# Patient Record
Sex: MALE | Race: OTHER | HISPANIC OR LATINO | Employment: UNEMPLOYED | URBAN - METROPOLITAN AREA
[De-identification: names, ages, dates, MRNs, and addresses within clinical notes are randomized per-mention and may not be internally consistent; named-entity substitution may affect disease eponyms.]

---

## 2021-04-09 ENCOUNTER — APPOINTMENT (EMERGENCY)
Dept: RADIOLOGY | Facility: HOSPITAL | Age: 36
End: 2021-04-09

## 2021-04-09 ENCOUNTER — HOSPITAL ENCOUNTER (EMERGENCY)
Facility: HOSPITAL | Age: 36
Discharge: HOME | End: 2021-04-09
Attending: EMERGENCY MEDICINE | Admitting: EMERGENCY MEDICINE

## 2021-04-09 VITALS
SYSTOLIC BLOOD PRESSURE: 116 MMHG | DIASTOLIC BLOOD PRESSURE: 65 MMHG | RESPIRATION RATE: 18 BRPM | HEART RATE: 66 BPM | OXYGEN SATURATION: 100 % | WEIGHT: 171.96 LBS | TEMPERATURE: 97.5 F

## 2021-04-09 DIAGNOSIS — R07.9 CHEST PAIN: Primary | ICD-10-CM

## 2021-04-09 DIAGNOSIS — R00.2 PALPITATIONS: ICD-10-CM

## 2021-04-09 LAB
ALBUMIN SERPL BCP-MCNC: 4.3 G/DL (ref 3.5–5)
ALP SERPL-CCNC: 72 U/L (ref 46–116)
ALT SERPL W P-5'-P-CCNC: 21 U/L (ref 12–78)
ANION GAP SERPL CALCULATED.3IONS-SCNC: 9 MMOL/L (ref 4–13)
AST SERPL W P-5'-P-CCNC: 18 U/L (ref 5–45)
ATRIAL RATE: 60 BPM
BASOPHILS # BLD AUTO: 0.02 THOUSANDS/ΜL (ref 0–0.1)
BASOPHILS NFR BLD AUTO: 0 % (ref 0–1)
BILIRUB SERPL-MCNC: 0.73 MG/DL (ref 0.2–1)
BUN SERPL-MCNC: 15 MG/DL (ref 5–25)
CALCIUM SERPL-MCNC: 9.4 MG/DL (ref 8.3–10.1)
CHLORIDE SERPL-SCNC: 107 MMOL/L (ref 100–108)
CO2 SERPL-SCNC: 26 MMOL/L (ref 21–32)
CREAT SERPL-MCNC: 0.98 MG/DL (ref 0.6–1.3)
EOSINOPHIL # BLD AUTO: 0.02 THOUSAND/ΜL (ref 0–0.61)
EOSINOPHIL NFR BLD AUTO: 0 % (ref 0–6)
ERYTHROCYTE [DISTWIDTH] IN BLOOD BY AUTOMATED COUNT: 11.7 % (ref 11.6–15.1)
GFR SERPL CREATININE-BSD FRML MDRD: 99 ML/MIN/1.73SQ M
GLUCOSE SERPL-MCNC: 100 MG/DL (ref 65–140)
HCT VFR BLD AUTO: 42.8 % (ref 36.5–49.3)
HGB BLD-MCNC: 14.7 G/DL (ref 12–17)
IMM GRANULOCYTES # BLD AUTO: 0.01 THOUSAND/UL (ref 0–0.2)
IMM GRANULOCYTES NFR BLD AUTO: 0 % (ref 0–2)
LIPASE SERPL-CCNC: 113 U/L (ref 73–393)
LYMPHOCYTES # BLD AUTO: 1.31 THOUSANDS/ΜL (ref 0.6–4.47)
LYMPHOCYTES NFR BLD AUTO: 23 % (ref 14–44)
MCH RBC QN AUTO: 31.8 PG (ref 26.8–34.3)
MCHC RBC AUTO-ENTMCNC: 34.3 G/DL (ref 31.4–37.4)
MCV RBC AUTO: 93 FL (ref 82–98)
MONOCYTES # BLD AUTO: 0.35 THOUSAND/ΜL (ref 0.17–1.22)
MONOCYTES NFR BLD AUTO: 6 % (ref 4–12)
NEUTROPHILS # BLD AUTO: 4.11 THOUSANDS/ΜL (ref 1.85–7.62)
NEUTS SEG NFR BLD AUTO: 71 % (ref 43–75)
NRBC BLD AUTO-RTO: 0 /100 WBCS
P AXIS: 61 DEGREES
PLATELET # BLD AUTO: 204 THOUSANDS/UL (ref 149–390)
PMV BLD AUTO: 9.7 FL (ref 8.9–12.7)
POTASSIUM SERPL-SCNC: 3.8 MMOL/L (ref 3.5–5.3)
PR INTERVAL: 130 MS
PROT SERPL-MCNC: 7.6 G/DL (ref 6.4–8.2)
QRS AXIS: 77 DEGREES
QRSD INTERVAL: 96 MS
QT INTERVAL: 400 MS
QTC INTERVAL: 400 MS
RBC # BLD AUTO: 4.62 MILLION/UL (ref 3.88–5.62)
SODIUM SERPL-SCNC: 142 MMOL/L (ref 136–145)
T WAVE AXIS: 67 DEGREES
TROPONIN I SERPL-MCNC: <0.02 NG/ML
VENTRICULAR RATE: 60 BPM
WBC # BLD AUTO: 5.82 THOUSAND/UL (ref 4.31–10.16)

## 2021-04-09 PROCEDURE — 84484 ASSAY OF TROPONIN QUANT: CPT | Performed by: EMERGENCY MEDICINE

## 2021-04-09 PROCEDURE — 85025 COMPLETE CBC W/AUTO DIFF WBC: CPT | Performed by: EMERGENCY MEDICINE

## 2021-04-09 PROCEDURE — 80053 COMPREHEN METABOLIC PANEL: CPT | Performed by: EMERGENCY MEDICINE

## 2021-04-09 PROCEDURE — 36415 COLL VENOUS BLD VENIPUNCTURE: CPT | Performed by: EMERGENCY MEDICINE

## 2021-04-09 PROCEDURE — 93005 ELECTROCARDIOGRAM TRACING: CPT

## 2021-04-09 PROCEDURE — 93010 ELECTROCARDIOGRAM REPORT: CPT | Performed by: INTERNAL MEDICINE

## 2021-04-09 PROCEDURE — 99285 EMERGENCY DEPT VISIT HI MDM: CPT | Performed by: EMERGENCY MEDICINE

## 2021-04-09 PROCEDURE — 71046 X-RAY EXAM CHEST 2 VIEWS: CPT

## 2021-04-09 PROCEDURE — 83690 ASSAY OF LIPASE: CPT | Performed by: EMERGENCY MEDICINE

## 2021-04-09 PROCEDURE — 99285 EMERGENCY DEPT VISIT HI MDM: CPT

## 2021-04-09 NOTE — DISCHARGE INSTRUCTIONS
Regrese a la carmen de emergencias si el dolor empeora gravemente, aumenta la dificultad para respirar, [place], [place], vómitos, fiebre o cualquier otro síntoma nuevo o preocupante  Return to the emergency department for severe worsening of pain, increased difficulty breathing, lightheadedness, fainting, vomiting, fever, any other new or concerning symptoms

## 2021-04-09 NOTE — ED PROVIDER NOTES
History  Chief Complaint   Patient presents with    Palpitations     palpitations, chest pain and sob after taking caffeine pill with coffee     HPI  27 yo male with no significant known PMH presents to the ED via EMS with concern for chest discomfort radiating to left arm, palpitations, and shortness of breath starting approximately 1 hour prior to arrival while he was driving to work  Pt stopped his car and began walking on the side of the road but symptoms persisted so he decided to come to the ED  A neighbor who is a nurse gave him 3 aspirin tablets and called EMS for him  Sxs are still present on arrival to the ED but have improved somewhat  Pt cannot describe pain and cannot identify any aggravating or alleviating factors  Notes he did take caffeine pill this morning but he takes them often and has never had symptoms associated with them before  Pt denies nausea, vomiting, lightheadedness, dizziness, but he does report some generalized weakness and fatigue  He also reports that he had some left upper abdominal pain and diarrhea earlier this morning that has since improved  Notes that he had similar chest pain and palpitations 2 years ago when he lived in [facility] and it was associated with bright red blood in his urine  Pt denies any urinary symptoms currently  No fevers, chills, cough, congestion, leg pain/swelling, hx DVT/PE, recent surgery, recent travel  Pt does not take any daily medications  None       History reviewed  No pertinent past medical history  History reviewed  No pertinent surgical history  History reviewed  No pertinent family history  I have reviewed and agree with the history as documented      E-Cigarette/Vaping    E-Cigarette Use Never User      E-Cigarette/Vaping Substances    Nicotine No     THC No     CBD No     Flavoring No     Other No     Unknown No      Social History     Tobacco Use    Smoking status: Not on file   Substance Use Topics    Alcohol use: Not Currently    Drug use: Not Currently        Review of Systems   Constitutional: Positive for fatigue  Negative for chills and fever  HENT: Negative for congestion, rhinorrhea and sore throat  Respiratory: Positive for shortness of breath  Negative for cough  Cardiovascular: Positive for chest pain and palpitations  Gastrointestinal: Positive for abdominal pain and diarrhea  Negative for nausea and vomiting  Genitourinary: Negative for dysuria and hematuria  Musculoskeletal: Negative for arthralgias and myalgias  Skin: Negative for rash  Neurological: Negative for dizziness, weakness, light-headedness, numbness and headaches  All other systems reviewed and are negative  Physical Exam  ED Triage Vitals [04/09/21 0916]   Temperature Pulse Respirations Blood Pressure SpO2   97 5 °F (36 4 °C) 66 18 116/65 100 %      Temp Source Heart Rate Source Patient Position - Orthostatic VS BP Location FiO2 (%)   Tympanic Monitor -- -- --      Pain Score       --             Orthostatic Vital Signs  Vitals:    04/09/21 0916   BP: 116/65   Pulse: 66       Physical Exam  Vitals signs and nursing note reviewed  Constitutional:       General: He is not in acute distress  Appearance: He is well-developed  He is not diaphoretic  HENT:      Head: Normocephalic and atraumatic  Right Ear: External ear normal       Left Ear: External ear normal    Eyes:      Conjunctiva/sclera: Conjunctivae normal       Pupils: Pupils are equal, round, and reactive to light  Neck:      Musculoskeletal: Normal range of motion and neck supple  Cardiovascular:      Rate and Rhythm: Normal rate and regular rhythm  Heart sounds: Normal heart sounds  No murmur  No friction rub  No gallop  Pulmonary:      Effort: Pulmonary effort is normal  No respiratory distress  Breath sounds: Normal breath sounds  No wheezing, rhonchi or rales  Chest:      Chest wall: No tenderness     Abdominal:      General: Bowel sounds are normal  There is no distension  Palpations: Abdomen is soft  Tenderness: There is no abdominal tenderness  Musculoskeletal: Normal range of motion  Lymphadenopathy:      Cervical: No cervical adenopathy  Skin:     General: Skin is warm and dry  Neurological:      General: No focal deficit present  Mental Status: He is alert and oriented to person, place, and time  Cranial Nerves: No cranial nerve deficit  Sensory: No sensory deficit  Motor: No weakness           ED Medications  Medications - No data to display    Diagnostic Studies  Results Reviewed     Procedure Component Value Units Date/Time    Troponin I [[id number]]  (Normal) Collected: 04/09/21 1002    Lab Status: Final result Specimen: Blood from Arm, Left Updated: 04/09/21 1029     Troponin I <0 02 ng/mL     Comprehensive metabolic panel [[id number]] Collected: 04/09/21 1002    Lab Status: Final result Specimen: Blood from Arm, Left Updated: 04/09/21 1027     Sodium 142 mmol/L      Potassium 3 8 mmol/L      Chloride 107 mmol/L      CO2 26 mmol/L      ANION GAP 9 mmol/L      BUN 15 mg/dL      Creatinine 0 98 mg/dL      Glucose 100 mg/dL      Calcium 9 4 mg/dL      AST 18 U/L      ALT 21 U/L      Alkaline Phosphatase 72 U/L      Total Protein 7 6 g/dL      Albumin 4 3 g/dL      Total Bilirubin 0 73 mg/dL      eGFR 99 ml/min/1 73sq m     Narrative:      [place] guidelines for Chronic Kidney Disease (CKD):     Stage 1 with normal or high GFR (GFR > 90 mL/min/1 73 square meters)    Stage 2 Mild CKD (GFR = 60-89 mL/min/1 73 square meters)    Stage 3A Moderate CKD (GFR = 45-59 mL/min/1 73 square meters)    Stage 3B Moderate CKD (GFR = 30-44 mL/min/1 73 square meters)    Stage 4 Severe CKD (GFR = 15-29 mL/min/1 73 square meters)    Stage 5 End Stage CKD (GFR <15 mL/min/1 73 square meters)  Note: GFR calculation is accurate only with a steady state creatinine    Lipase [[id number]]  (Normal) Collected: 04/09/21 1002    Lab Status: Final result Specimen: Blood from Arm, Left Updated: 04/09/21 1027     Lipase 113 u/L     CBC and differential [[id number]] Collected: 04/09/21 1002    Lab Status: Final result Specimen: Blood from Arm, Left Updated: 04/09/21 1012     WBC 5 82 Thousand/uL      RBC 4 62 Million/uL      Hemoglobin 14 7 g/dL      Hematocrit 42 8 %      MCV 93 fL      MCH 31 8 pg      MCHC 34 3 g/dL      RDW 11 7 %      MPV 9 7 fL      Platelets 776 Thousands/uL      nRBC 0 /100 WBCs      Neutrophils Relative 71 %      Immat GRANS % 0 %      Lymphocytes Relative 23 %      Monocytes Relative 6 %      Eosinophils Relative 0 %      Basophils Relative 0 %      Neutrophils Absolute 4 11 Thousands/µL      Immature Grans Absolute 0 01 Thousand/uL      Lymphocytes Absolute 1 31 Thousands/µL      Monocytes Absolute 0 35 Thousand/µL      Eosinophils Absolute 0 02 Thousand/µL      Basophils Absolute 0 02 Thousands/µL                  XR chest 2 views   ED Interpretation by Katie Murphy MD (04/09 1000)   No focal consolidation, effusion, or pneumothorax      Final Result by Krysten Ruiz MD (04/09 1020)      No acute cardiopulmonary disease  Workstation performed: [id number]               Procedures  Procedures      ED Course  ED Course as of Apr 09 1110 Fri Apr 09, 2021   0915 EKG sinus rhythm rate 61  Normal axis  No ST elevations or depressions  Nonspecific T wave flattening aVL, T wave inversion V1, V2  Qtc 400  No prior EKG available for comparison       1052 Pt reports symptoms are resolved and he is comfortable with discharge  Advised not to take caffeine pills anymore  Pt does not have a primary care physician, provided with information for SOD clinic and advised to make follow up appointment  Given strict return precautions                   HEART Risk Score      Most Recent Value   Heart Score Risk Calculator   History  0 Filed at: 04/09/2021 1055   ECG  0 Filed at: 04/09/2021 1   Age  0 Filed at: 04/09/2021 1055   Risk Factors  0 Filed at: 04/09/2021 1055   Troponin  0 Filed at: 04/09/2021 1055   HEART Score  0 Filed at: 04/09/2021 1055                      SBIRT 20yo+      Most Recent Value   SBIRT (23 yo +)   In order to provide better care to our patients, we are screening all of our patients for alcohol and drug use  Would it be okay to ask you these screening questions? No Filed at: 04/09/2021 1556                MDM  29 yo male with no significant known PMH presenting with concern for chest pain, palpitations, SOB, generalized weakness, diarrhea  Normal vital signs and benign exam in the ED  Will obtain CBC, CMP, Troponin, Lipase, EKG, CXR to evaluate for leukocytosis, anemia, electrolyte abnormality, renal dysfunction, hepatic dysfunction, acute cardiac abnormality, pancreatitis, pneumonia/pneumothorax/effusion  Disposition  Final diagnoses:   Chest pain   Palpitations     Time reflects when diagnosis was documented in both MDM as applicable and the Disposition within this note     Time User Action Codes Description Comment    4/9/2021 10:53 AM Marisabel Up Add [R07 9] Chest pain     4/9/2021 10:53 AM Marisabel Up Add [R00 2] Palpitations       ED Disposition     ED Disposition Condition Date/Time Comment    Discharge Stable Fri Apr 9, 2021 10:53 AM Matt Ross discharge to home/self care  Follow-up Information     Follow up With Specialties Details Why Contact Info Additional [address] [facility] Internal Medicine Schedule an appointment as soon as possible for a visit   [address] [place], [place] [address] [address], [address] [place] [address], [place], [facility], [address] [place] [address], [id number] [phone]          There are no discharge medications for this patient  No discharge procedures on file      PDMP Review     None           ED Provider  Attending physically available and evaluated Tiffanie Cai I managed the patient along with the ED Attending      Electronically Signed by         Thao Chanel MD  04/09/21 9188

## 2021-04-09 NOTE — ED ATTENDING ATTESTATION
Final Diagnosis:  1  Chest pain    2  Palpitations           IBibi MD, saw and evaluated the patient  All available labs and X-rays were ordered by me or the resident and have been reviewed by myself  I discussed the patient with the resident / non-physician and agree with the resident's / non-physician practitioner's findings and plan as documented in the resident's / non-physician practicitioner's note, except where noted  At this point, I agree with the current assessment done in the ED  I was present during key portions of all procedures performed unless otherwise stated  Chief Complaint   Patient presents with    Palpitations     palpitations, chest pain and sob after taking caffeine pill with coffee     This is a 28 y o  male presenting for evaluation of palpitations  The patient states that he took a caffeine pill with coffee he then went to work  Was going started feels heart racing, some chest pain a little bit shortness of breath  He mentions that he has had something like this before but was assisted with hematuria resolved by itself  Denies any fevers chills nausea vomiting  No falls or injuries  No other new medications  No numbness tingling down his arms or legs  He feels essentially asymptomatic currently  He does mention that he saw a nurse or something walking by on the road who said to take 3 aspirin so took that before coming in? PMH:   has no past medical history on file  PSH:   has no past surgical history on file      Social:  Social History     Substance and Sexual Activity   Alcohol Use Not Currently     Social History     Tobacco Use   Smoking Status Not on file     Social History     Substance and Sexual Activity   Drug Use Not Currently     PE:  Vitals:    04/09/21 0916 04/09/21 0945   BP: 116/65    Pulse: 66    Resp: 18    Temp: 97 5 °F (36 4 °C)    TempSrc: Tympanic    SpO2: 100%    Weight:  78 kg (171 lb 15 3 oz)      General: VSS, NAD, awake, alert    Well-nourished, well-developed  Appears stated age  Head: Normocephalic, atraumatic, nontender  Eyes: PERRL, EOM-I  No diplopia  No hyphema  No subconjunctival hemorrhages  Symmetrical lids  ENTAtraumatic external nose and ears  MMM  No stridor  Normal phonation  No drooling  Base of mouth is soft  No mastoid tenderness  Neck: Symmetric, trachea midline  No JVD  CV: Peripheral pulses +2 throughout  No chest wall tenderness  Lungs:   Unlabored   No retractions  No crepitus  No tachypnea  No paradoxical motion  Abd: +BS, soft, NT/ND    MSK:   FROM   No lower extremity edema  Back:   No CVAT  Skin: Dry, intact  Neuro: AAOx3, GCS 15, CN II-XII grossly intact  Motor grossly intact  Psychiatric/Behavioral: Appropriate mood and affect   Exam: deferred  A:  - Palpitations  P:  - Cardiac workup    - 13 point ROS was performed and all are normal unless stated in the history above  - Nursing note reviewed  Vitals reviewed  - Orders placed by myself and/or advanced practitioner / resident     - Previous chart was reviewed  - Azeri language barrier:  used  - History obtained from patient  - There are no limitations to the history obtained  - Critical care time: Not applicable for this patient  Code Status: No Order  Advance Directive and Living Will:      Power of :    POLST:      Medications - No data to display  XR chest 2 views   ED Interpretation   No focal consolidation, effusion, or pneumothorax      Final Result      No acute cardiopulmonary disease                    Workstation performed: [id number]           Orders Placed This Encounter   Procedures    XR chest 2 views    CBC and differential    Comprehensive metabolic panel    Troponin I    Lipase    EKG RESULTS    ECG 12 lead    ECG 12 lead     Labs Reviewed   TROPONIN I - Normal       Result Value Ref Range Status    Troponin I <0 02  <=0 04 ng/mL Final    Comment: Siemens Chemistry analyzer 99% cutoff is > 0 04 ng/mL in network labs     o cTnI 99% cutoff is useful only when applied to patients in the clinical setting of myocardial ischemia   o cTnI 99% cutoff should be interpreted in the context of clinical history, ECG findings and possibly cardiac imaging to establish correct diagnosis  o cTnI 99% cutoff may be suggestive but clearly not indicative of a coronary event without the clinical setting of myocardial ischemia       LIPASE - Normal    Lipase 113  73 - 393 u/L Final   CBC AND DIFFERENTIAL    WBC 5 82  4 31 - 10 16 Thousand/uL Final    RBC 4 62  3 88 - 5 62 Million/uL Final    Hemoglobin 14 7  12 0 - 17 0 g/dL Final    Hematocrit 42 8  36 5 - 49 3 % Final    MCV 93  82 - 98 fL Final    MCH 31 8  26 8 - 34 3 pg Final    MCHC 34 3  31 4 - 37 4 g/dL Final    RDW 11 7  11 6 - 15 1 % Final    MPV 9 7  8 9 - 12 7 fL Final    Platelets 024  493 - 390 Thousands/uL Final    nRBC 0  /100 WBCs Final    Neutrophils Relative 71  43 - 75 % Final    Immat GRANS % 0  0 - 2 % Final    Lymphocytes Relative 23  14 - 44 % Final    Monocytes Relative 6  4 - 12 % Final    Eosinophils Relative 0  0 - 6 % Final    Basophils Relative 0  0 - 1 % Final    Neutrophils Absolute 4 11  1 85 - 7 62 Thousands/µL Final    Immature Grans Absolute 0 01  0 00 - 0 20 Thousand/uL Final    Lymphocytes Absolute 1 31  0 60 - 4 47 Thousands/µL Final    Monocytes Absolute 0 35  0 17 - 1 22 Thousand/µL Final    Eosinophils Absolute 0 02  0 00 - 0 61 Thousand/µL Final    Basophils Absolute 0 02  0 00 - 0 10 Thousands/µL Final   COMPREHENSIVE METABOLIC PANEL    Sodium 371  136 - 145 mmol/L Final    Potassium 3 8  3 5 - 5 3 mmol/L Final    Chloride 107  100 - 108 mmol/L Final    CO2 26  21 - 32 mmol/L Final    ANION GAP 9  4 - 13 mmol/L Final    BUN 15  5 - 25 mg/dL Final    Creatinine 0 98  0 60 - 1 30 mg/dL Final    Comment: Standardized to IDMS reference method    Glucose 100  65 - 140 mg/dL Final    Comment: If the patient is fasting, the ADA then defines impaired fasting glucose as > 100 mg/dL and diabetes as > or equal to 123 mg/dL  Specimen collection should occur prior to Sulfasalazine administration due to the potential for falsely depressed results  Specimen collection should occur prior to Sulfapyridine administration due to the potential for falsely elevated results  Calcium 9 4  8 3 - 10 1 mg/dL Final    AST 18  5 - 45 U/L Final    Comment: Specimen collection should occur prior to Sulfasalazine administration due to the potential for falsely depressed results  ALT 21  12 - 78 U/L Final    Comment: Specimen collection should occur prior to Sulfasalazine and/or Sulfapyridine administration due to the potential for falsely depressed results  Alkaline Phosphatase 72  46 - 116 U/L Final    Total Protein 7 6  6 4 - 8 2 g/dL Final    Albumin 4 3  3 5 - 5 0 g/dL Final    Total Bilirubin 0 73  0 20 - 1 00 mg/dL Final    Comment: Use of this assay is not recommended for patients undergoing treatment with eltrombopag due to the potential for falsely elevated results      eGFR 99  ml/min/1 73sq m Final    Narrative:     [place] guidelines for Chronic Kidney Disease (CKD):     Stage 1 with normal or high GFR (GFR > 90 mL/min/1 73 square meters)    Stage 2 Mild CKD (GFR = 60-89 mL/min/1 73 square meters)    Stage 3A Moderate CKD (GFR = 45-59 mL/min/1 73 square meters)    Stage 3B Moderate CKD (GFR = 30-44 mL/min/1 73 square meters)    Stage 4 Severe CKD (GFR = 15-29 mL/min/1 73 square meters)    Stage 5 End Stage CKD (GFR <15 mL/min/1 73 square meters)  Note: GFR calculation is accurate only with a steady state creatinine     Time reflects when diagnosis was documented in both MDM as applicable and the Disposition within this note     Time User Action Codes Description Comment    4/9/2021 10:53 AM Tate Banks Add [R07 9] Chest pain     4/9/2021 10:53 AM Tate Banks Add [R00 2] Palpitations       ED Disposition     ED Disposition Condition Date/Time Comment    Discharge Stable Fri Apr 9, 2021 10:53 AM Orlando Baldwin discharge to home/self care  Follow-up Information     Follow up With Specialties Details Why Contact Info Additional [address] [facility] Internal Medicine Schedule an appointment as soon as possible for a visit   [address] [place] [address] [address] [place] [phone]  [facility] [address], [address] [place] [address], [facility], [place], [place], [address] [phone]        There are no discharge medications for this patient  No discharge procedures on file  None       Portions of the record may have been created with voice recognition software  Occasional wrong word or "sound a like" substitutions may have occurred due to the inherent limitations of voice recognition software  Read the chart carefully and recognize, using context, where substitutions have occurred      Electronically signed by:  Judit Hess